# Patient Record
Sex: MALE | Race: WHITE | Employment: FULL TIME | URBAN - METROPOLITAN AREA
[De-identification: names, ages, dates, MRNs, and addresses within clinical notes are randomized per-mention and may not be internally consistent; named-entity substitution may affect disease eponyms.]

---

## 2017-10-09 ENCOUNTER — APPOINTMENT (OUTPATIENT)
Dept: CT IMAGING | Facility: HOSPITAL | Age: 22
End: 2017-10-09
Attending: EMERGENCY MEDICINE

## 2017-10-09 PROCEDURE — 70450 CT HEAD/BRAIN W/O DYE: CPT | Performed by: EMERGENCY MEDICINE
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
## 2018-01-30 ENCOUNTER — HOSPITAL ENCOUNTER (EMERGENCY)
Facility: HOSPITAL | Age: 23
Discharge: LEFT BEFORE DISCHARGE | End: 2018-01-30
Payer: COMMERCIAL

## 2018-01-30 ENCOUNTER — LAB ENCOUNTER (OUTPATIENT)
Dept: LAB | Facility: HOSPITAL | Age: 23
End: 2018-01-30
Attending: NURSE PRACTITIONER
Payer: COMMERCIAL

## 2018-01-30 ENCOUNTER — OFFICE VISIT (OUTPATIENT)
Dept: FAMILY MEDICINE CLINIC | Facility: CLINIC | Age: 23
End: 2018-01-30

## 2018-01-30 ENCOUNTER — TELEPHONE (OUTPATIENT)
Dept: FAMILY MEDICINE CLINIC | Facility: CLINIC | Age: 23
End: 2018-01-30

## 2018-01-30 VITALS
BODY MASS INDEX: 26.31 KG/M2 | OXYGEN SATURATION: 100 % | HEART RATE: 101 BPM | WEIGHT: 205 LBS | DIASTOLIC BLOOD PRESSURE: 78 MMHG | RESPIRATION RATE: 18 BRPM | SYSTOLIC BLOOD PRESSURE: 110 MMHG | HEIGHT: 74 IN | TEMPERATURE: 99 F

## 2018-01-30 DIAGNOSIS — R05.9 COUGH: ICD-10-CM

## 2018-01-30 DIAGNOSIS — R50.9 FEVER, UNSPECIFIED FEVER CAUSE: ICD-10-CM

## 2018-01-30 DIAGNOSIS — J10.1 INFLUENZA A: Primary | ICD-10-CM

## 2018-01-30 PROCEDURE — 87999 UNLISTED MICROBIOLOGY PX: CPT

## 2018-01-30 PROCEDURE — 99213 OFFICE O/P EST LOW 20 MIN: CPT | Performed by: NURSE PRACTITIONER

## 2018-01-30 PROCEDURE — 87502 INFLUENZA DNA AMP PROBE: CPT

## 2018-01-30 PROCEDURE — 87798 DETECT AGENT NOS DNA AMP: CPT

## 2018-01-30 RX ORDER — OSELTAMIVIR PHOSPHATE 75 MG/1
75 CAPSULE ORAL 2 TIMES DAILY
Qty: 10 CAPSULE | Refills: 0 | Status: SHIPPED | OUTPATIENT
Start: 2018-01-30 | End: 2018-02-04

## 2018-01-30 NOTE — TELEPHONE ENCOUNTER
Patient has had high fever for two days (101, 100), coughing to the point of vomiting. Would like to know if he should wait this out or come in for medication.

## 2018-01-30 NOTE — TELEPHONE ENCOUNTER
Record shows insurance as [employer]. Call to pt-reports onset yesterday of waking with fever to 101, hot flashes and cough to the point of vomiting. sts is able to keep food and fluids down, urinating in normal amts/frequency.  sts only vomits when he ca

## 2018-01-31 NOTE — PROGRESS NOTES
Britney Ramos is a 25year old male. HPI:   Patient presents today  reporting a cough, fever (T-max 101.5 F po), body aches and chills. He reports that yesterday he was at [employer] works as a  and he was coughing so hard he vomited.  He d GENERAL: well developed, well nourished,in no apparent distress  HEENT: TM clear bilaterally, nose no congestion, throat clear- mild erythema without mass. No frontal or maxillary sinus tenderness with palpation.   EYES: PERRLA, EOM intact, sclera clear w

## 2019-06-23 ENCOUNTER — HOSPITAL ENCOUNTER (EMERGENCY)
Dept: HOSPITAL 41 - JD.ED | Age: 24
Discharge: HOME | End: 2019-06-23
Payer: COMMERCIAL
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
## 2019-06-23 DIAGNOSIS — Z88.8: ICD-10-CM
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
## 2019-06-23 DIAGNOSIS — S62.634A: Primary | ICD-10-CM

## 2023-04-28 ENCOUNTER — TELEPHONE (OUTPATIENT)
Dept: FAMILY MEDICINE CLINIC | Facility: CLINIC | Age: 28
End: 2023-04-28

## 2023-04-30 ENCOUNTER — PATIENT OUTREACH (OUTPATIENT)
Dept: CASE MANAGEMENT | Age: 28
End: 2023-04-30

## 2023-04-30 NOTE — PROCEDURES
The office order for PCP removal request is Approved and finalized on April 30, 2023.     Thanks,  [facility]

## (undated) NOTE — ED AVS SNAPSHOT
Nicole Jeffries   MRN: [id number]    Department:  [facility] Emergency Department   Date of Visit:  10/9/2017           Disclosure     Insurance plans vary and the physician(s) referred by the ER may not be covered by your plan.  Please contact y If you have been prescribed any medication(s), please fill your prescription right away and begin taking the medication(s) as directed    If the emergency physician has read X-rays, these will be re-interpreted by a radiologist.  If there is a significant